# Patient Record
Sex: MALE | Race: WHITE | NOT HISPANIC OR LATINO | URBAN - METROPOLITAN AREA
[De-identification: names, ages, dates, MRNs, and addresses within clinical notes are randomized per-mention and may not be internally consistent; named-entity substitution may affect disease eponyms.]

---

## 2023-09-26 ENCOUNTER — LAB (OUTPATIENT)
Dept: LAB | Facility: LAB | Age: 58
End: 2023-09-26
Payer: COMMERCIAL

## 2023-09-27 LAB — TOBACCO SCREEN, URINE: NEGATIVE

## 2024-01-31 ENCOUNTER — LAB (OUTPATIENT)
Dept: LAB | Facility: LAB | Age: 59
End: 2024-01-31
Payer: COMMERCIAL

## 2024-01-31 ENCOUNTER — OFFICE VISIT (OUTPATIENT)
Dept: PRIMARY CARE | Facility: CLINIC | Age: 59
End: 2024-01-31
Payer: COMMERCIAL

## 2024-01-31 VITALS — DIASTOLIC BLOOD PRESSURE: 74 MMHG | WEIGHT: 174.5 LBS | SYSTOLIC BLOOD PRESSURE: 138 MMHG

## 2024-01-31 DIAGNOSIS — Z00.00 HEALTH CARE MAINTENANCE: Primary | ICD-10-CM

## 2024-01-31 DIAGNOSIS — I10 PRIMARY HYPERTENSION: ICD-10-CM

## 2024-01-31 DIAGNOSIS — E78.2 MIXED HYPERLIPIDEMIA: ICD-10-CM

## 2024-01-31 LAB
ALBUMIN SERPL BCP-MCNC: 4.6 G/DL (ref 3.4–5)
ALP SERPL-CCNC: 34 U/L (ref 33–120)
ALT SERPL W P-5'-P-CCNC: 21 U/L (ref 10–52)
AST SERPL W P-5'-P-CCNC: 15 U/L (ref 9–39)
BILIRUB DIRECT SERPL-MCNC: 0.1 MG/DL (ref 0–0.3)
BILIRUB SERPL-MCNC: 0.4 MG/DL (ref 0–1.2)
CHOLEST SERPL-MCNC: 154 MG/DL (ref 0–199)
CHOLESTEROL/HDL RATIO: 2.9
HDLC SERPL-MCNC: 52.5 MG/DL
LDLC SERPL CALC-MCNC: 77 MG/DL
NON HDL CHOLESTEROL: 102 MG/DL (ref 0–149)
PROT SERPL-MCNC: 6.8 G/DL (ref 6.4–8.2)
TRIGL SERPL-MCNC: 124 MG/DL (ref 0–149)
VLDL: 25 MG/DL (ref 0–40)

## 2024-01-31 PROCEDURE — 80076 HEPATIC FUNCTION PANEL: CPT

## 2024-01-31 PROCEDURE — 99213 OFFICE O/P EST LOW 20 MIN: CPT | Performed by: INTERNAL MEDICINE

## 2024-01-31 PROCEDURE — 80061 LIPID PANEL: CPT

## 2024-01-31 PROCEDURE — 3075F SYST BP GE 130 - 139MM HG: CPT | Performed by: INTERNAL MEDICINE

## 2024-01-31 PROCEDURE — 3078F DIAST BP <80 MM HG: CPT | Performed by: INTERNAL MEDICINE

## 2024-01-31 PROCEDURE — 36415 COLL VENOUS BLD VENIPUNCTURE: CPT

## 2024-01-31 RX ORDER — AMLODIPINE BESYLATE 5 MG/1
5 TABLET ORAL NIGHTLY
Qty: 90 TABLET | Refills: 1 | Status: SHIPPED | OUTPATIENT
Start: 2024-01-31 | End: 2024-05-13

## 2024-01-31 RX ORDER — AMLODIPINE BESYLATE 5 MG/1
5 TABLET ORAL NIGHTLY
COMMUNITY
End: 2024-01-31 | Stop reason: SDUPTHER

## 2024-01-31 NOTE — PROGRESS NOTES
Subjective   Patient ID: Niraj Lakhani is a 58 y.o. male who presents for No chief complaint on file..    HPI follow-up visit no chest pain no shortness of breath no side effect with medication prior laboratory results reviewed bowels normal no dysuria    Review of Systems    Objective   There were no vitals taken for this visit.    Physical Exam vital signs noted alert and oriented x 3 NCAT no JVD or bruit chest clear to auscultation CV regular rate and rhythm S1-S2 no murmur gallop or rub extremities no clubbing cyanosis or edema normal distal pulses    Assessment/Plan    impression hypertension hyperlipidemia other diagnoses  Plan continue with good efforts at diet regular exercise increase water consumption weight stability or weight loss continue with amlodipine for blood pressure refills see EMR check hepatic panel lipid panel advised on cholesterol cholesterol medicine needs new referral to dermatologist for general skin care requisition made then recheck for regular physical examination and blood work in 6 months Endor sooner as needed

## 2024-05-11 DIAGNOSIS — I10 PRIMARY HYPERTENSION: ICD-10-CM

## 2024-05-13 RX ORDER — AMLODIPINE BESYLATE 5 MG/1
5 TABLET ORAL NIGHTLY
Qty: 90 TABLET | Refills: 1 | Status: SHIPPED | OUTPATIENT
Start: 2024-05-13

## 2024-06-11 NOTE — PROGRESS NOTES
Subjective     Niraj Lakhani is a 58 y.o. male who presents for the following: Skin Check.  He notes a flare of red, dry, itchy areas on his left forearm over the past few weeks.  He denies any new, changing, or concerning skin lesions since his last visit; no bleeding, itching, or burning lesions.      Review of Systems:  No other skin or systemic complaints other than what is documented elsewhere in the note.    The following portions of the chart were reviewed this encounter and updated as appropriate:       Skin Cancer History  No skin cancer on file.    Specialty Problems    None      Past Dermatologic / Past Relevant Medical History:    - history of AKs s/p PDT on his face by Dr. Pina on 12/1/21 and then again by Dr. 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    Current Medications / CAM's:    Current Outpatient Medications:     amLODIPine (Norvasc) 5 mg tablet, TAKE 1 TABLET (5 MG) BY MOUTH ONCE DAILY AT BEDTIME., Disp: 90 tablet, Rfl: 1     Objective   Well appearing patient in no apparent distress; mood and affect are within normal limits.    A full examination was performed including scalp, face, eyes, ears, nose, lips, neck, chest, axillae, abdomen, back, bilateral upper extremities, and bilateral lower extremities. All findings within normal limits unless otherwise noted below.    Assessment/Plan   1. Neoplasm of uncertain behavior of skin (2)  Left Proximal Dorsal Hand  9 mm dark brown pigmented, asymmetric macule with an asymmetric pigment network and irregular borders           Shave removal    Lesion length (cm):  0.9  Margin per side (cm):   0  Lesion diameter (cm):  0.9  Informed consent: discussed and consent obtained    Timeout: patient name, date of birth, surgical site, and procedure verified    Procedure prep:  Patient was prepped and draped  Anesthesia: the lesion was anesthetized in a standard fashion    Anesthetic:  1% lidocaine w/ epinephrine 1-100,000 local infiltration  Instrument used: flexible razor blade    Hemostasis achieved with: aluminum chloride    Outcome: patient tolerated procedure well    Post-procedure details: sterile dressing applied and wound care instructions given    Dressing type: bandage and petrolatum      Staff Communication: Dermatology Local Anesthesia: 1 % Lidocaine / Epinephrine - Amount:0.5ml    Specimen 1 - Dermatopathology- DERM LAB  Differential Diagnosis: lentigo v SK v [facility]  Check Margins Yes/No?:    Comments:    Dermpath Lab: Routine Histopathology (formalin-fixed tissue)    Left Ear Antitragus  3 mm dark brown pigmented, asymmetric macule with an asymmetric pigment network and irregular borders           Lesion biopsy    Staff Communication: Dermatology Local Anesthesia: 1 % Lidocaine / Epinephrine - Amount:0.5ml    Specimen 2 - Dermatopathology- DERM LAB  Differential Diagnosis: SK v [facility]  Check Margins Yes/No?:    Comments:    Dermpath Lab: Routine Histopathology (formalin-fixed tissue)    2. Actinic keratosis (16)  Head - Anterior (Face) (16)  Scattered on the patient's face and bilateral ears, there are multiple erythematous, gritty, scaly macules    Actinic Keratoses -scattered on face and bilateral ears.  The pre-cancerous nature of these lesions and treatment options, including liquid nitrogen cryotherapy and field therapy, such as with a course of topical therapy with Efudex 5% cream as well as photodynamic therapy, were discussed extensively with the patient today.  After discussing the risks and benefits of each of these options at length, the patient expressed understanding and wishes to undergo  cryotherapy for the AKs on his face and ears.  However, the patient states he has a social engagement this evening and, thus, would prefer to defer cryotherapy for the Aks on his face if possible.  Thus, he was instructed to schedule a return visit in our office for within the next 1-2 months for cryotherapy for these lesions.  The patient expressed understanding, is in agreement with this plan, and states he wishes to undergo cryotherapy for the AKs on his ears today and will schedule a return visit in our office for within the next 1-2 months for cryotherapy for the Aks on his face upon leaving today.    Destr of lesion - Head - Anterior (Face)  Complexity: simple    Destruction method: cryotherapy    Informed consent: discussed and consent obtained    Lesion destroyed using liquid nitrogen: Yes    Cryotherapy cycles:  1  Outcome: patient tolerated procedure well with no complications    Post-procedure details: wound care instructions given      3. Melanocytic nevus of trunk  Scattered on the patient's face, neck, trunk, and extremities, there are multiple small, round- to oval-shaped, brown-pigmented and pink-colored, symmetric, uniform-appearing macules and dome-shaped papules    Clinically benign- to slightly atypical-appearing nevi - the clinically benign- to slightly atypical-appearing nature of the remainder of the patient's nevi was discussed with the patient today.  None of the patient's nevi, with the exception of the 2 noted above, meet threshold for biopsy today.  I emphasized the importance of performing monthly self-skin exams using the ABCDs of monitoring moles, which were reviewed with the patient today and an informational hand-out provided.  I also emphasized the importance of sun avoidance and sun protection with daily sunscreen use.    4. Seborrheic keratosis  Scattered on the patient's face, neck, trunk, and extremities, there are multiple tan- to light brown-colored, hyperkeratotic, stuck-on  appearing papules of varying size and shape    Seborrheic Keratoses - the benign nature of these lesions was discussed with the patient today and reassurance provided.  No treatment is medically indicated for these lesions at this time.    5. Dermatitis  Left Forearm - Anterior  On the patient's left forearm, there are multiple erythematous, scaly papules coalescing into several ill-defined, slightly lichenified, thin plaques    Eczema -flare on left forearm.  The potentially chronic and intermittently flaring nature of this condition and treatment options were discussed extensively with the patient today.  At this time, I recommend topical steroid therapy with Triamcinolone 0.1% ointment, which the patient was instructed to apply twice daily to the affected areas of his left forearm (avoid face, groin, body folds) for the next 2 weeks, followed by taper to twice daily on weekends only for persistent areas; the patient may repeat treatment in a 2-week burst-and-taper fashion every 6-8 weeks as needed for future flares.  I also emphasized the importance of dry, sensitive skin care, including the use of a mild soap, such as Dove, and frequent and aggressive moisturization, at least twice daily and immediately following showers or baths, with recommended over-the-counter moisturizing creams, such as Eucerin, Cetaphil, Cerave, or Aveeno, or Vaseline or Aquaphor ointments.  The risks, benefits, and side effects of this medication, including possible skin atrophy with overuse of topical steroids, were discussed.  The patient expressed understanding and is in agreement with this plan.    6. History of dysplastic nevus  On the patient's left proximal shoulder, there is a well-healed scar with no evidence of recurrent growth or pigmentation on exam today.    History of dysplastic nevus and actinic keratoses and photodamage.  There is no evidence of recurrence on exam today.  I emphasized the importance of continuing to  perform monthly self-skin exams using the ABCDs of monitoring moles, which were reviewed with the patient, as well as the importance of sun avoidance and sun protection with daily sunscreen use.  I will have the patient return to our office in 4 to 6 months, pending the above biopsy results, for routine follow-up and skin exam, and the patient was instructed to call our office should the patient notice any new, changing, symptomatic, or otherwise concerning skin lesions before then.  The patient expressed understanding and is in agreement with this plan.    7. Diffuse photodamage of skin  Photodistributed  Diffuse photodamage with actinic changes with telangiectasia and mottled pigmentation in sun-exposed areas.    Photodamage.  The signs and symptoms of skin cancer were reviewed and the patient was advised to practice sun protection and sun avoidance, use daily sunscreen, and perform regular self skin exams.  Sun protection was discussed, including avoiding the mid-day sun, wearing a sunscreen with SPF at least 50, and stressing the need for reapplication of sunscreen and applying more than they think they need.

## 2024-06-13 ENCOUNTER — APPOINTMENT (OUTPATIENT)
Dept: DERMATOLOGY | Facility: CLINIC | Age: 59
End: 2024-06-13
Payer: COMMERCIAL

## 2024-06-13 DIAGNOSIS — D48.5 NEOPLASM OF UNCERTAIN BEHAVIOR OF SKIN: Primary | ICD-10-CM

## 2024-06-13 DIAGNOSIS — L30.9 DERMATITIS: ICD-10-CM

## 2024-06-13 DIAGNOSIS — L57.8 DIFFUSE PHOTODAMAGE OF SKIN: ICD-10-CM

## 2024-06-13 DIAGNOSIS — L82.1 SEBORRHEIC KERATOSIS: ICD-10-CM

## 2024-06-13 DIAGNOSIS — L57.0 ACTINIC KERATOSIS: ICD-10-CM

## 2024-06-13 DIAGNOSIS — D22.5 MELANOCYTIC NEVUS OF TRUNK: ICD-10-CM

## 2024-06-13 DIAGNOSIS — Z86.018 HISTORY OF DYSPLASTIC NEVUS: ICD-10-CM

## 2024-06-13 PROCEDURE — 11306 SHAVE SKIN LESION 0.6-1.0 CM: CPT | Performed by: DERMATOLOGY

## 2024-06-13 PROCEDURE — 69100 BIOPSY OF EXTERNAL EAR: CPT | Performed by: DERMATOLOGY

## 2024-06-13 PROCEDURE — 17004 DESTROY PREMAL LESIONS 15/>: CPT | Performed by: DERMATOLOGY

## 2024-06-13 PROCEDURE — 99214 OFFICE O/P EST MOD 30 MIN: CPT | Performed by: DERMATOLOGY

## 2024-06-13 ASSESSMENT — DERMATOLOGY PATIENT ASSESSMENT
ARE YOU AN ORGAN TRANSPLANT RECIPIENT: NO
DO YOU USE A TANNING BED: NO
DO YOU HAVE ANY NEW OR CHANGING LESIONS: NO
DO YOU USE SUNSCREEN: OCCASIONALLY

## 2024-06-13 ASSESSMENT — DERMATOLOGY QUALITY OF LIFE (QOL) ASSESSMENT: ARE THERE EXCLUSIONS OR EXCEPTIONS FOR THE QUALITY OF LIFE ASSESSMENT: NO

## 2024-06-13 ASSESSMENT — ITCH NUMERIC RATING SCALE: HOW SEVERE IS YOUR ITCHING?: 0

## 2024-06-17 LAB
LABORATORY COMMENT REPORT: NORMAL
PATH REPORT.FINAL DX SPEC: NORMAL
PATH REPORT.GROSS SPEC: NORMAL
PATH REPORT.MICROSCOPIC SPEC OTHER STN: NORMAL
PATH REPORT.RELEVANT HX SPEC: NORMAL
PATH REPORT.TOTAL CANCER: NORMAL

## 2024-08-19 ENCOUNTER — APPOINTMENT (OUTPATIENT)
Dept: DERMATOLOGY | Facility: CLINIC | Age: 59
End: 2024-08-19
Payer: COMMERCIAL

## 2024-08-19 DIAGNOSIS — L57.0 ACTINIC KERATOSIS: Primary | ICD-10-CM

## 2024-08-19 DIAGNOSIS — L30.9 DERMATITIS: ICD-10-CM

## 2024-08-19 DIAGNOSIS — L57.8 DIFFUSE PHOTODAMAGE OF SKIN: ICD-10-CM

## 2024-08-19 DIAGNOSIS — L81.4 LENTIGO: ICD-10-CM

## 2024-08-19 DIAGNOSIS — L82.1 SEBORRHEIC KERATOSIS: ICD-10-CM

## 2024-08-19 PROCEDURE — 17004 DESTROY PREMAL LESIONS 15/>: CPT | Performed by: DERMATOLOGY

## 2024-08-19 PROCEDURE — 99214 OFFICE O/P EST MOD 30 MIN: CPT | Performed by: DERMATOLOGY

## 2024-08-19 RX ORDER — GABAPENTIN 100 MG/1
CAPSULE ORAL
COMMUNITY
Start: 2017-11-08

## 2024-08-19 RX ORDER — FLUOCINONIDE 0.5 MG/G
CREAM TOPICAL
COMMUNITY
Start: 2020-09-03

## 2024-08-19 RX ORDER — TRIAMCINOLONE ACETONIDE 0.25 MG/G
CREAM TOPICAL
Qty: 80 G | Refills: 0 | Status: SHIPPED | OUTPATIENT
Start: 2024-08-19

## 2024-08-19 RX ORDER — ATORVASTATIN CALCIUM 40 MG/1
40 TABLET, FILM COATED ORAL NIGHTLY
COMMUNITY

## 2024-08-19 RX ORDER — FLUOROURACIL 50 MG/G
CREAM TOPICAL
Qty: 40 G | Refills: 2 | Status: SHIPPED | OUTPATIENT
Start: 2024-08-19

## 2024-08-19 RX ORDER — TRIAMCINOLONE ACETONIDE 1 MG/G
30 CREAM TOPICAL
COMMUNITY
Start: 2020-03-12

## 2024-08-19 ASSESSMENT — DERMATOLOGY PATIENT ASSESSMENT
DO YOU USE A TANNING BED: NO
DO YOU HAVE ANY NEW OR CHANGING LESIONS: NO
DO YOU USE SUNSCREEN: OCCASIONALLY

## 2024-08-19 ASSESSMENT — DERMATOLOGY QUALITY OF LIFE (QOL) ASSESSMENT: ARE THERE EXCLUSIONS OR EXCEPTIONS FOR THE QUALITY OF LIFE ASSESSMENT: NO

## 2024-08-20 NOTE — PROGRESS NOTES
Subjective     Niraj Lakhani is a 58 y.o. male who presents for the following: LN2 (Liquid Nitrogen).  The patient was recently seen in our office on 6/13/24, at which time multiple actinic keratoses were identified on his face, but he declined treatment for these lesions at that time, because he had upcoming social engagements.  Of note, biopsy of 2 suspicious lesions performed at that visit revealed a lentigo on his left proximal dorsal hand and a pigmented actinic keratosis on his left ear antitragus.    Today, the patient states the biopsy sites healed well.  He denies any new, changing, or concerning skin lesions since his last visit; no bleeding, itching, or burning lesions.      Review of Systems:  No other skin or systemic complaints other than what is documented elsewhere in the note.    The following portions of the chart were reviewed this encounter and updated as appropriate:       Skin Cancer History  No skin cancer on file.    Specialty Problems    None      Past Dermatologic / Past Relevant Medical History:      Family History:      Social History:      Allergies:  Patient has no known allergies.    Current Medications / CAM's:    Current Outpatient Medications:     fluocinonide 0.05 % cream, Apply topically., Disp: , Rfl:     gabapentin (Neurontin) 100 mg capsule, Take by mouth., Disp: , Rfl:     triamcinolone (Kenalog) 0.1 % cream, Apply 300 Applications (30 g) topically., Disp: , Rfl:     amLODIPine (Norvasc) 5 mg tablet, TAKE 1 TABLET (5 MG) BY MOUTH ONCE DAILY AT BEDTIME., Disp: 90 tablet, Rfl: 1    atorvastatin (Lipitor) 40 mg tablet, Take 1 tablet (40 mg) by mouth once daily at bedtime., Disp: , Rfl:     fluorouracil (Efudex) 5 % cream, Apply to the affected felicia of the face twice daily for a total of 3 weeks. Wash hands thoroughly after each application., Disp: 40 g, Rfl: 2    triamcinolone (Kenalog) 0.025 % cream, Apply twice daily to affected areas (avoid the eyes) for 1-2 weeks following  course of Efudex as directed, Disp: 80 g, Rfl: 0     Objective   Well appearing patient in no apparent distress; mood and affect are within normal limits.    A skin examination was performed including the face and neck. All findings within normal limits unless otherwise noted below.    Assessment/Plan   1. Actinic keratosis (27)  Head - Anterior (Face) (27)  On the patient's left ear antitragus, there is a pink, well-healed scar at the recent biopsy site with a surrounding rim of erythema, grittiness, and scale; scattered on the patient's face, there are multiple erythematous, gritty, scaly macules    Biopsy-proven Pigmented Actinic Keratosis and additional AKs -left ear antitragus and scattered on face, respectively.  The pre-cancerous nature of these lesions and treatment options, including liquid nitrogen cryotherapy and field therapy, such as with a course of topical therapy with Efudex 5% cream, were discussed extensively with the patient today.  At this time, I recommend treatment with liquid nitrogen cryotherapy in the office today.  In addition, following cryotherapy, I recommend topical field therapy with a course of Efudex 5% cream, which the patient was instructed to apply twice daily to affected areas of his face for 2-3 weeks.  The risks, benefits, and side effects of this medication, including the redness and irritation expected with its use, were discussed; the patient was instructed to discontinue use of the medication earlier if necessary due to excessive irritation.  I also prescribed topical steroid therapy with Triamcinolone 0.025% cream, which the patient may apply twice daily to affected areas for up to 7-10 days as needed for inflammation following the course of Efudex, which the patient plans to complete in January 2025.  The risks, benefits, and side effects of these medications, including the redness, irritation, and discomfort associated with Efudex use as well as possible skin atrophy  with overuse of topical steroids, were discussed at length.  I will have the patient return to my office in 5 to 6 months for follow-up.  The patient expressed understanding, is in agreement with this plan, and wishes to proceed with cryotherapy today.    Destr of lesion - Head - Anterior (Face) (27)  Complexity: simple    Destruction method: cryotherapy    Informed consent: discussed and consent obtained    Lesion destroyed using liquid nitrogen: Yes    Cryotherapy cycles:  1  Outcome: patient tolerated procedure well with no complications    Post-procedure details: wound care instructions given      fluorouracil (Efudex) 5 % cream - Head - Anterior (Face) (27)  Apply to the affected felicia of the face twice daily for a total of 3 weeks. Wash hands thoroughly after each application.    triamcinolone (Kenalog) 0.025 % cream - Head - Anterior (Face) (27)  Apply twice daily to affected areas (avoid the eyes) for 1-2 weeks following course of Efudex as directed    2. Seborrheic keratosis  Scattered on the patient's face and neck, there are several tan- to light brown-colored, hyperkeratotic, stuck-on appearing papules of varying size and shape    Seborrheic Keratoses - the benign nature of these lesions was discussed with the patient today and reassurance provided.  No treatment is medically indicated for these lesions at this time.    3. Lentigo  Photodistributed  Multiple tan- to light brown-colored, round- to oval-shaped, symmetric and uniform-appearing macules and small patches consistent with lentigines scattered in sun-exposed areas.    Solar Lentigines and photodamage.  The clinically benign-appearing nature of these lesions and their relation to chronic sun exposure were discussed with the patient today and reassurance provided.  None of these lesions meet threshold for biopsy today, and thus no treatment is medically indicated for these lesions at this time.  The signs and symptoms of skin cancer were reviewed  and the patient was advised to practice sun protection and sun avoidance, use daily sunscreen, and perform regular self skin exams.  The patient was instructed to monitor these lesions for any changes, such as in size, shape, or color, or associated symptoms and to call our office to schedule a return visit for re-evaluation if any such changes or symptoms are noticed in the future.  The patient expressed understanding and is in agreement with this plan.    4. Dermatitis    Related Medications  triamcinolone (Kenalog) 0.025 % cream  Apply twice daily to affected areas (avoid the eyes) for 1-2 weeks following course of Efudex as directed    5. Diffuse photodamage of skin  Photodistributed  Diffuse photodamage with actinic changes with telangiectasia and mottled pigmentation in sun-exposed areas.    History of dysplastic nevus and actinic keratoses and photodamage.  The patient was recently seen in our office for routine follow-up and skin exam on 6/13/24, at which time no evidence of recurrence was noted.  I emphasized the importance of continuing to perform monthly self-skin exams using the ABCDs of monitoring moles, which were reviewed with the patient, as well as the importance of sun avoidance and sun protection with daily sunscreen use.  I will have the patient return to our office in 5 to 6 months for routine follow-up and skin exam, and the patient was instructed to call our office should the patient notice any new, changing, symptomatic, or otherwise concerning skin lesions before then.  The patient expressed understanding and is in agreement with this plan.

## 2024-09-03 ENCOUNTER — TELEPHONE (OUTPATIENT)
Dept: PRIMARY CARE | Facility: CLINIC | Age: 59
End: 2024-09-03

## 2024-09-16 DIAGNOSIS — M79.603 PAIN OF UPPER EXTREMITY, UNSPECIFIED LATERALITY: Primary | ICD-10-CM

## 2024-10-30 ENCOUNTER — EVALUATION (OUTPATIENT)
Dept: PHYSICAL THERAPY | Facility: CLINIC | Age: 59
End: 2024-10-30
Payer: COMMERCIAL

## 2024-10-30 DIAGNOSIS — M79.603 PAIN OF UPPER EXTREMITY, UNSPECIFIED LATERALITY: ICD-10-CM

## 2024-10-30 DIAGNOSIS — M54.2 NECK PAIN ON LEFT SIDE: Primary | ICD-10-CM

## 2024-10-30 PROCEDURE — 97161 PT EVAL LOW COMPLEX 20 MIN: CPT | Mod: GP | Performed by: PHYSICAL THERAPIST

## 2024-10-30 PROCEDURE — 97110 THERAPEUTIC EXERCISES: CPT | Mod: GP | Performed by: PHYSICAL THERAPIST

## 2024-10-30 ASSESSMENT — PAIN - FUNCTIONAL ASSESSMENT: PAIN_FUNCTIONAL_ASSESSMENT: 0-10

## 2024-10-30 ASSESSMENT — PAIN SCALES - GENERAL: PAINLEVEL_OUTOF10: 4

## 2024-11-07 ENCOUNTER — TREATMENT (OUTPATIENT)
Dept: PHYSICAL THERAPY | Facility: CLINIC | Age: 59
End: 2024-11-07
Payer: COMMERCIAL

## 2024-11-07 DIAGNOSIS — M54.2 NECK PAIN ON LEFT SIDE: ICD-10-CM

## 2024-11-07 DIAGNOSIS — M79.603 PAIN OF UPPER EXTREMITY, UNSPECIFIED LATERALITY: ICD-10-CM

## 2024-11-07 PROCEDURE — 97140 MANUAL THERAPY 1/> REGIONS: CPT | Mod: GP | Performed by: PHYSICAL THERAPIST

## 2024-11-07 PROCEDURE — 97110 THERAPEUTIC EXERCISES: CPT | Mod: GP | Performed by: PHYSICAL THERAPIST

## 2024-11-07 NOTE — PROGRESS NOTES
Physical Therapy  Physical Therapy Treatment Note    Patient Name: Niraj Lakhani  MRN: [id number]  Today's Date: 11/7/2024  Time Calculation  Start Time: 1424  Stop Time: 1502  Time Calculation (min): 38 min    Insurance:  Visit number: 2 of 12 thru 12/31/24  Authorization info: Auth needed  Insurance Type:  employee consumer select plan      General:  Reason for visit: left neck + arm pain   Referred by: Dr. Bradford       Current Problem  1. Pain of upper extremity, unspecified laterality  Follow Up In Physical Therapy      2. Neck pain on left side  Follow Up In Physical Therapy          Precautions: neuropathy       Subjective:     Patient reports the intensity of his symptoms have improved since beginning his HEP, but his symptoms are still present.     Pain   2/10    Performing HEP?: Yes      Objective:   Increased tone along L>R cervical paraspinals and levator       Treatment Performed:  - UBE x 3' fwd/back  - STM and manual stretching to cervical paraspinals, levator, upper trap, and rhomboid x 12'  - seated upper trap stretch with over pressure 2 x 30 seconds  - seated chin tucks x 20    - Black tband rows 2 x 12    - Black tband shoulder extension 2 x 12    - prone rows with 8# DB 2 x 10  - thoracic extension stretch 3 x 15 seconds     HEP Access Code: [email or web address]     Charges:  2- TE  1- Manual       Assessment:   The focus of today's session was on improving cervical soft tissue mobility, improving postural stability, and thoracic mobility. The pt demonstrated excellent tolerance to the noted exercises today. The patient demonstrates good progress towards their goals at this time as demonstrated by an improvement in symptoms.  No pain was reported during the treatment session today, however, patient did continue to experience symptoms into his L hand (with a decrease in intensity throughout session reported).  The patient continues to present with pain, compromised stability and strength in postural  muscles, and functional limitations at this time therefore would continue to benefit from skilled PT.      Plan:  Monitor response to today's treatment and progress HEP as tolerated.      Lorie Mott, PT

## 2024-11-14 ENCOUNTER — TREATMENT (OUTPATIENT)
Dept: PHYSICAL THERAPY | Facility: CLINIC | Age: 59
End: 2024-11-14
Payer: COMMERCIAL

## 2024-11-14 DIAGNOSIS — M54.2 NECK PAIN ON LEFT SIDE: ICD-10-CM

## 2024-11-14 DIAGNOSIS — M79.603 PAIN OF UPPER EXTREMITY, UNSPECIFIED LATERALITY: ICD-10-CM

## 2024-11-14 PROCEDURE — 97110 THERAPEUTIC EXERCISES: CPT | Mod: GP

## 2024-11-14 PROCEDURE — 97140 MANUAL THERAPY 1/> REGIONS: CPT | Mod: GP

## 2024-11-14 NOTE — PROGRESS NOTES
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 Bradford       Current Problem  1. Pain of upper extremity, unspecified laterality  Follow Up In Physical Therapy      2. Neck pain on left side  Follow Up In Physical Therapy            Precautions: neuropathy       Subjective:     Patient reports  still having numbness and tinglinh down arm to hand, mainly in 3-5 digits, but does feel better when doing postural exercises.    Pain   2-3/10 tingling in hand    Performing HEP?: Yes      Objective:   Increased tone along L>R cervical paraspinals and levator   Shoulder    Functional Rating Scale     Observation     Shoulder palpation/Joint Assessment     Cervical AROM  Cervical flexion: (80°): WFL  Cervical extension: (50°): WFL  Cervical Rotation: (80°): 75%  Cervical rotation left: (80°): 85%  Cervical sidebend right: (45°): 85%  Cervical sidebend left: (45°): 70%    Cervical Myotomes (MMT     Shoulder Strength  R shoulder ER: (5/5): 4+  L shoulder ER: (5/5): 4  Scapular MMT  R lower trapezius: (5/5): 3+  L lower trapezius: (5/5): 3  R middle trapezius: (5/5): 4-  L middle trapezius: (5/5): 3+      Treatment Performed:    Manual:  -C/S distraction and C7-T1 lateral and up glides x 9 min  - first rib/SCJ/ACJ  with scap mobs x 8 min  - STM /ART sub occipitals & R UT/ev scap/ scalenes/pec/ant deltx 11 min  Prone scap  -lock 1 x15- 3sec  -lock 2 x15sec  - lock 3 x 15 3sec  Band low straight arm pulldown 2 x 15 5 sec  Band pullapart 2 x15- 5 sec  Banded no money ER 2 x 10- 5sec  Banded hornblower ER 2 x 15 - 3 sec    -HEP Access Code: [email or web address]     Charges:  1-  TE  2- Manual       Assessment:   The focus of the session was Strengthening, ROM, Stretching, joint mobilizations, soft tissue massage, Motor Control, Dynamic Stability Training, and functional training. The pt demonstrated Good and Improving tolerance to the noted exercises today. The pt is demonstrated Good and Improving progress in skilled rehab at this time. The pt is still limited in overall Strength, ROM, Flexibility, Motor control, and Pain at this time. The pt continues to be a good candidate for skilled PT, in order to further improve Strength, ROM, Flexibility, Motor control, and Pain. Focus was on postural education and strength to decrease stress on neck and lower cervical nerves to decrease numbness and tingling with pt reports feeling much better by end of session.         Plan:  Monitor response to today's treatment and progress HEP as tolerated.  Postural strength and neck / shoulder complex mobility  Check neck and shoulder motion  T-spine motion  Shoulder strength    Graeme Shah, PT

## 2024-11-21 ENCOUNTER — TREATMENT (OUTPATIENT)
Dept: PHYSICAL THERAPY | Facility: CLINIC | Age: 59
End: 2024-11-21
Payer: COMMERCIAL

## 2024-11-21 DIAGNOSIS — M54.2 NECK PAIN ON LEFT SIDE: ICD-10-CM

## 2024-11-21 DIAGNOSIS — M79.603 PAIN OF UPPER EXTREMITY, UNSPECIFIED LATERALITY: ICD-10-CM

## 2024-11-21 PROCEDURE — 97140 MANUAL THERAPY 1/> REGIONS: CPT | Mod: GP

## 2024-11-21 PROCEDURE — 97112 NEUROMUSCULAR REEDUCATION: CPT | Mod: GP

## 2024-11-21 PROCEDURE — 97110 THERAPEUTIC EXERCISES: CPT | Mod: GP

## 2024-11-21 NOTE — PROGRESS NOTES
Physical Therapy  Physical Therapy Treatment Note    Patient Name: Niraj Lakhani  MRN: [id number]  Today's Date: 11/21/2024  Time Calculation  Start Time: 1032  Stop Time: 1117  Time Calculation (min): 45 min    Insurance:  Visit number: 4 of 12 thru 12/31/24  Authorization info: Auth needed  Insurance Type:  employee consumer select plan      General:  Reason for visit: left neck + arm pain   Referred by: Dr. Bradford       Current Problem  1. Pain of upper extremity, unspecified laterality  Follow Up In Physical Therapy      2. Neck pain on left side  Follow Up In Physical Therapy              Precautions: neuropathy       Subjective:     Patient reports having tingling all the way down arm upon coming in. Always better with exercises.    Pain   3/10 tingling in arm and down to hand    Performing HEP?: Yes      Objective:   Increased tone along L>R cervical paraspinals and levator   Shoulder          Decreased T-spine rot to L    Treatment Performed:      Manual:  -C/S distraction and C4-C7 lateral and up glides x 10 min  - STM /ART sub occipitals & R UT/ scalenes/ manual ulnar nerve glide x 6 min  Ulnar nerve floss 2 x 10  KB hold thoracic rot L 2 x 10-  breath hold  Towel  KB suitcase carry 3 x 45 sec  Towel  low row x15- 3 sec 15 lbs  Towel  mid row x15 - 3 sec 17.5 lbs  Towel  high row x 15- 3 sec 17.5lbs  Banded no money ER 2 x 12- 5sec BTB w/ 3 sec ecc  KB bottom shoulder press to OH carry 2 x 10 + 30sec carry      Access Code: [email or web address]  URL: [email or web address]  Date: 11/21/2024  Prepared by: Graeme Shah    Exercises  - Seated Cervical Retraction  - 2 x daily - 7 x weekly - 2 sets - 10 reps - 3 second  hold  - Supine Cervical Retraction with Towel  - 1 x daily - 7 x weekly - 2 sets - 10 reps  - Seated Scapular Retraction  - 2 x daily - 7 x weekly - 2 sets - 10 reps  - Seated Upper Trapezius Stretch  - 3 x daily - 7 x weekly - 1 sets - 2 reps - 20 second  hold  - Ulnar Nerve Flossing  - 1-3 x daily - 7 x weekly - 10-15 reps  - Shoulder External Rotation and Scapular Retraction with Resistance  - 1 x daily - 7 x weekly - 3 sets - 12 reps - 5 hold  - Kettlebell Suitcase Carry  - 1 x daily - 7 x weekly - 3 sets - 45 sec hold    Charges:  1- TE  1- Manual   1-NMR    Assessment:   Pt tolerated session well and improved scap mechanics with cueing with varied angle rows and felt less pian and tingling in hand with ulnar nerve floss and with challenging  with towel for suitcase carry and varied angle rows. Continue to progress  and postural strength to achieve goals.          Plan:  Monitor response to today's treatment and progress HEP as tolerated.  Postural strength and neck / shoulder complex mobility  Check neck and shoulder motion  T-spine motion  Shoulder strength   strength    Graeme Shah, PT

## 2024-12-03 ENCOUNTER — APPOINTMENT (OUTPATIENT)
Dept: PHYSICAL THERAPY | Facility: CLINIC | Age: 59
End: 2024-12-03
Payer: COMMERCIAL

## 2024-12-13 ENCOUNTER — TREATMENT (OUTPATIENT)
Dept: PHYSICAL THERAPY | Facility: CLINIC | Age: 59
End: 2024-12-13
Payer: COMMERCIAL

## 2024-12-13 DIAGNOSIS — M54.2 NECK PAIN ON LEFT SIDE: ICD-10-CM

## 2024-12-13 DIAGNOSIS — M79.603 PAIN OF UPPER EXTREMITY, UNSPECIFIED LATERALITY: ICD-10-CM

## 2024-12-13 PROCEDURE — 97110 THERAPEUTIC EXERCISES: CPT | Mod: GP

## 2024-12-13 NOTE — PROGRESS NOTES
Physical Therapy  Physical Therapy Treatment Note    Patient Name: Niraj Lakhani  MRN: [id number]  Today's Date: 12/13/2024       Insurance:  Visit number: 5 of 12 thru 12/31/24  Authorization info: Auth needed  Insurance Type:  employee consumer select plan      General:  Reason for visit: left neck + arm pain   Referred by: Dr. Bradford       Current Problem  1. Pain of upper extremity, unspecified laterality  Follow Up In Physical Therapy      2. Neck pain on left side  Follow Up In Physical Therapy                Precautions: neuropathy       Subjective:     Patient reports continuing to do much better, feeling stronger, not dropping thing, able to type at computer, just gets minimal tingling in pinky at this point.    Pain   1/10 tingling in pinky    Performing HEP?: Yes      Objective:   Increased tone along L>R cervical paraspinals and levator   ROM  Per EVAL:  Cervical AROM (Degrees)    Flexion: 44  Extension: 43  (L) Side Bend: 35  (R) Side Bend: 37  (L) Rotation: 52  (R) Rotation: 50 (pulling sensation noted along L side of neck)      Shoulder AROM (Degrees)  All WNL         Strength Testing    Shoulder    (R)  (L)  Flexion: 5/5  5/5      Abduction: 5/5  4+/5     ER:  5/5  4+/5     IR:  5/5  4+/5         Elbow    (R)  (L)  Flexion: 5/5  5/5       Extension: 5/5  5-/5         Periscapular Musculature    (R)  (L)  Upper Traps: 5/5  4+/5     Middle Traps: 5/5  4-/5     Lower Traps: 5/5  4+/5         strength:  R: 95#  L: 55#    Dermatome testing: WNL BL       Coordination: WNL BL    Posture: mild L shoulder elevation and forward rounded shoulders       Palpation: +TTP L upper trap (from origin to insertion)        Special Tests  Thoracic Outlet   Maria Isabel Test: +       Radicular Symptoms: to L fingertips      TODAY:    Shoulder    Cervical AROM  Cervical flexion: (80°): WFL  Cervical extension: (50°): WFL  Cervical Rotation: (80°): 80%  Cervical rotation left: (80°): WFL  Cervical sidebend right: (45°):  85  Cervical sidebend left: (45°): 80%  Shoulder AROM  Shoulder AROM WFL: yes  Shoulder PROM     Cervical Myotomes (MMT  R Scapular Elevation (C4 ): (5/5): 5  L Scapular Elevation (C4 ): (5/5): 5  R Shoulder Abduction (C5 ): (5/5): 5  L Shoulder Abduction (C5 ): (5/5): 4+  R Elbow Flexion (C6 ): (5/5): 5  L Elbow Flexion (C6 ): (5/5): 5  R Wrist Extension (C6 ): (5/5): 5  L Wrist Extension (C6 ): (5/5): 4  R Elbow Extension (C7 ): (5/5): 5  L Elbow Extension (C7 ): (5/5): 5  R Wrist Flexion (C7 ): (5/5): 5  L Wrist Flexion (C7 ): (5/5): 5  Shoulder Strength  R shoulder flexion: (5/5): 4+  L shoulder flexion: (5/5): 4  R shoulder extension: (5/5): 5  L shoulder extension: (5/5): 5  R shoulder abduction: (5/5): 4+  L shoulder abduction: (5/5): 4+  R shoulder ER: (5/5): 4+  L shoulder ER: (5/5): 4  R shoulder IR: (5/5) : 5  L shoulder IR: (5/5): 5  Scapular MMT     DTR      Shoulder Special   Quick DASH= 14= 6.82% disability     strength:  R: 105#  L: 92.5#    Decreased T-spine rot to L    Treatment Performed:  Tests and measures with pt education on rationale and addressing impairments x 19 min  Prone scap T 2 x 15- 3 sec  Ulnar nerve floss 2 x 15  Prone phys W to Y  2 x 12    Access Code: [email or web address]      Charges:  3- TE      Assessment:   The focus of the session was Strengthening, ROM, Motor Control, Dynamic Stability Training, and functional training. The pt demonstrated Good and Improving tolerance to the noted exercises today. The pt is demonstrated Good and Improving progress in skilled rehab at this time. The pt is still limited in overall Strength, ROM, Flexibility, Motor control, and Pain at this time. The pt continues to be a good candidate for skilled PT, in order to further improve Strength, ROM, Flexibility, Motor control, and Pain.   Pt is progressing very well and shows really great progress in  and postural strength and the amount of numbness and tingling is greatly reduced from full neck to arm  to mildly in pinky. Pt will continue to benefit form skilled pt for return to PLOF and no pain/ tingling in pinky.      Goals: Updated 12/13/2024  Active       PT Problem       PT Goal 1       Start:  10/30/24    Expected End:  01/28/25       In 4 weeks, patient will demonstrate improved cervical ROM in order to look over shoulder without report of stiffness. - met    In 4 weeks, patient will demonstrate improved upper trap flexibility. - met    In 4 weeks, patient will report an improvement in neck, shoulder, and arm pain in order to perform work related tasks such as typing at a computer without limitation. - met    In 6-8 weeks, patient will demonstrate improved UE strength by 1 grade in order to carry heavier items such as weights without difficulty. - progressing    In 6-8 weeks, patient will improve L  strength to at least 90% of the RUE.  - progressing (85-88%)    In 6-8 weeks, patient will improve QuickDASH score by 6.  - met    By discharge, patient will be independent with a final HEP.  -progressing                 Plan: assess symptoms, ulnar nerve floss to glide progression. DC?    Postural strength and neck / shoulder complex mobility  Check neck and shoulder motion  T-spine motion  Shoulder strength   strength    Graeme Shah, PT

## 2024-12-17 ENCOUNTER — APPOINTMENT (OUTPATIENT)
Dept: PHYSICAL THERAPY | Facility: CLINIC | Age: 59
End: 2024-12-17
Payer: COMMERCIAL

## 2024-12-17 DIAGNOSIS — M54.2 NECK PAIN ON LEFT SIDE: ICD-10-CM

## 2024-12-17 DIAGNOSIS — M79.603 PAIN OF UPPER EXTREMITY, UNSPECIFIED LATERALITY: ICD-10-CM

## 2024-12-17 PROCEDURE — 97110 THERAPEUTIC EXERCISES: CPT | Mod: GP

## 2024-12-17 PROCEDURE — 97140 MANUAL THERAPY 1/> REGIONS: CPT | Mod: GP

## 2024-12-17 NOTE — PROGRESS NOTES
Physical Therapy  Physical Therapy Treatment Note    Patient Name: Niraj Lakhani  MRN: [id number]  Today's Date: 12/17/2024  Time Calculation  Start Time: 1417  Stop Time: 1455  Time Calculation (min): 38 min    Insurance:  Visit number: 6 of 12 thru 12/31/24  Authorization info: Auth needed  Insurance Type:  employee consumer select plan      General:  Reason for visit: left neck + arm pain   Referred by: Dr. Bradford       Current Problem  1. Pain of upper extremity, unspecified laterality  Follow Up In Physical Therapy      2. Neck pain on left side  Follow Up In Physical Therapy                  Precautions: neuropathy       Subjective:     Patient reports still cotninuing to do werll, just intermittent tinglign in pinky     Pain   1/10 tingling in pinky    Performing HEP?: Yes      Objective:   Increased tone along L>R cervical paraspinals and levator     TODAY:    Shoulder    Cervical AROM     Shoulder AROM     Shoulder PROM     Cervical Myotomes (MMT     Shoulder Strength     Scapular MMT     DTR      Shoulder Special   Quick DASH= 14= 6.82% disability     strength:  R: 105#  L: 92.5#    5th digit ABD: 5/5 on R 3/5 on L    Decreased T-spine rot to L    Treatment Performed:  Cervical distraction: x 5 min  Cervical lateral glides C7-C4 x 3 min  AP glides C4-T3x 5 min  AP to inf glide  C7-T3 x 3 min  IASTM flexor pronator mass, biceps insertion, flexor carpi ulnaris extensor carpi ulnaris x 8 min lev scap pin and stretch x 6 min  Prone scap I 2 x15- 3 sec  Prone scap T 2 x 15- 3 sec  Peanut chin tuck mobs x 8 min  Tennis ball trigger point lev scap with shld ff x 6 min  Pt edu on exercises and nerve decompressionx 9 min      Access Code: [email or web address]      Charges:  1- TE  2 - Man    Assessment:   Session focused on education of symptoms management and nerve compression at neck and peripherally as well as keeping up with postural exercises and nerve floss. Session focused on manual techniques to aid in proposed  nerve compression at neck and peripherally and also felt imrpvoed neck ROM in all directions after session. After lengthy discussion Pt and Pt agreed on potential soft discharge at this time and having scheduled appointment as place pham but if pt cancels will be Dc'd after 30 days from today.        Goals: Updated 12/17/2024  Active       PT Problem       PT Goal 1       Start:  10/30/24    Expected End:  01/28/25       In 4 weeks, patient will demonstrate improved cervical ROM in order to look over shoulder without report of stiffness. - met    In 4 weeks, patient will demonstrate improved upper trap flexibility. - met    In 4 weeks, patient will report an improvement in neck, shoulder, and arm pain in order to perform work related tasks such as typing at a computer without limitation. - met    In 6-8 weeks, patient will demonstrate improved UE strength by 1 grade in order to carry heavier items such as weights without difficulty. - progressing    In 6-8 weeks, patient will improve L  strength to at least 90% of the RUE.  - progressing (85-88%)    In 6-8 weeks, patient will improve QuickDASH score by 6.  - met    By discharge, patient will be independent with a final HEP.  -progressing                 Plan: DC    Postural strength and neck / shoulder complex mobility  Check neck and shoulder motion  T-spine motion  Shoulder strength   strength    Graeme Shah, PT

## 2025-01-14 ENCOUNTER — APPOINTMENT (OUTPATIENT)
Dept: PHYSICAL THERAPY | Facility: CLINIC | Age: 60
End: 2025-01-14
Payer: COMMERCIAL

## 2025-01-24 DIAGNOSIS — Z00.00 HEALTH CARE MAINTENANCE: ICD-10-CM

## 2025-01-24 DIAGNOSIS — I10 PRIMARY HYPERTENSION: ICD-10-CM

## 2025-01-24 RX ORDER — ATORVASTATIN CALCIUM 40 MG/1
40 TABLET, FILM COATED ORAL NIGHTLY
Qty: 30 TABLET | Refills: 0 | Status: SHIPPED | OUTPATIENT
Start: 2025-01-24

## 2025-01-24 RX ORDER — AMLODIPINE BESYLATE 5 MG/1
5 TABLET ORAL NIGHTLY
Qty: 30 TABLET | Refills: 0 | Status: SHIPPED | OUTPATIENT
Start: 2025-01-24

## 2025-03-17 ENCOUNTER — APPOINTMENT (OUTPATIENT)
Dept: DERMATOLOGY | Facility: CLINIC | Age: 60
End: 2025-03-17
Payer: COMMERCIAL

## 2025-03-17 NOTE — PROGRESS NOTES
Physical Therapy  Discharge Summary    Referral/Discharge Info:  Date of Discharge: 3/17/25  Date of Last Visit: 12/17/25  Date of Evaluation: 10/30/24  Number of Visits Attended: 6  Reason for visit: left neck + arm pain   Referred by: Dr. Bradford     Problems/Issues Addressed:  Neck ROM, postural strength. No more N&T      Status at Discharge:  Goals achieved cancelled last appointment due to return to [facility].    Reason for Discharge:  Achieved all and/or most goals         Graeme Shah, PT

## 2025-06-19 ENCOUNTER — TELEPHONE (OUTPATIENT)
Dept: PRIMARY CARE | Facility: CLINIC | Age: 60
End: 2025-06-19

## 2025-06-19 DIAGNOSIS — Z00.00 HEALTH CARE MAINTENANCE: ICD-10-CM

## 2025-06-19 RX ORDER — ATORVASTATIN CALCIUM 40 MG/1
40 TABLET, FILM COATED ORAL NIGHTLY
Qty: 30 TABLET | Refills: 0 | Status: SHIPPED | OUTPATIENT
Start: 2025-06-19

## 2025-09-22 ENCOUNTER — APPOINTMENT (OUTPATIENT)
Dept: DERMATOLOGY | Facility: CLINIC | Age: 60
End: 2025-09-22
Payer: COMMERCIAL